# Patient Record
Sex: FEMALE | Race: WHITE | NOT HISPANIC OR LATINO | Employment: FULL TIME | URBAN - METROPOLITAN AREA
[De-identification: names, ages, dates, MRNs, and addresses within clinical notes are randomized per-mention and may not be internally consistent; named-entity substitution may affect disease eponyms.]

---

## 2023-12-21 ENCOUNTER — OFFICE VISIT (OUTPATIENT)
Dept: URGENT CARE | Facility: PHYSICIAN GROUP | Age: 28
End: 2023-12-21
Payer: COMMERCIAL

## 2023-12-21 ENCOUNTER — HOSPITAL ENCOUNTER (OUTPATIENT)
Facility: MEDICAL CENTER | Age: 28
End: 2023-12-21
Payer: COMMERCIAL

## 2023-12-21 VITALS
TEMPERATURE: 98.6 F | HEART RATE: 99 BPM | SYSTOLIC BLOOD PRESSURE: 106 MMHG | OXYGEN SATURATION: 99 % | RESPIRATION RATE: 14 BRPM | WEIGHT: 116 LBS | DIASTOLIC BLOOD PRESSURE: 64 MMHG | HEIGHT: 67 IN | BODY MASS INDEX: 18.21 KG/M2

## 2023-12-21 DIAGNOSIS — Z20.2 ENCOUNTER FOR ASSESSMENT OF STD EXPOSURE: ICD-10-CM

## 2023-12-21 PROCEDURE — 3074F SYST BP LT 130 MM HG: CPT

## 2023-12-21 PROCEDURE — 87591 N.GONORRHOEAE DNA AMP PROB: CPT

## 2023-12-21 PROCEDURE — 99203 OFFICE O/P NEW LOW 30 MIN: CPT

## 2023-12-21 PROCEDURE — 3078F DIAST BP <80 MM HG: CPT

## 2023-12-21 PROCEDURE — 87491 CHLMYD TRACH DNA AMP PROBE: CPT

## 2023-12-21 RX ORDER — NORTRIPTYLINE HYDROCHLORIDE 25 MG/1
50 CAPSULE ORAL 2 TIMES DAILY
COMMUNITY
Start: 2023-11-17

## 2023-12-21 RX ORDER — ATOGEPANT 60 MG/1
1 TABLET ORAL DAILY
COMMUNITY

## 2023-12-21 RX ORDER — LORAZEPAM 1 MG/1
TABLET ORAL
COMMUNITY
Start: 2023-10-09

## 2023-12-21 RX ORDER — RIZATRIPTAN BENZOATE 10 MG/1
10 TABLET ORAL
COMMUNITY

## 2023-12-21 RX ORDER — ONDANSETRON 4 MG/1
4 TABLET, FILM COATED ORAL
COMMUNITY

## 2023-12-21 RX ORDER — GABAPENTIN 300 MG/1
300 CAPSULE ORAL 2 TIMES DAILY
COMMUNITY

## 2023-12-21 ASSESSMENT — ENCOUNTER SYMPTOMS
CHILLS: 0
FEVER: 0
DIARRHEA: 0
ABDOMINAL PAIN: 0
VOMITING: 0
NAUSEA: 0

## 2023-12-21 NOTE — PROGRESS NOTES
"  CHIEF COMPLAINT  Chief Complaint   Patient presents with    Sexually Transmitted Diseases     Check     Hand Pain     Right hand pain, punched steering wheel many times   X 4 days      Subjective:   Tracy Reid is a 28 y.o. female who presents to urgent care for STD testing has complaints to her right hand.  Patient reports concern for sexually transmitted disease as her partner was being monogamous in relationship.  She reports last sexual encounter was 1 month ago.  She denies any fevers or abnormal discharge.  She denies any abnormal bleeding or abdominal pain.  Patient also reports that she intentionally struck her steering well several times the lateral side of her hand.  Patient ports that she did when she is frustrated or stressed.  She reports that this time the site seems more swollen and painful.       Review of Systems   Constitutional:  Negative for chills and fever.   Gastrointestinal:  Negative for abdominal pain, diarrhea, nausea and vomiting.       PAST MEDICAL HISTORY  There are no problems to display for this patient.      SURGICAL HISTORY  patient denies any surgical history    ALLERGIES  Allergies   Allergen Reactions    Promethazine Unspecified     \"Passes out, seizures, stroke-like symptoms.\"    Lamotrigine Rash       CURRENT MEDICATIONS  Home Medications       Reviewed by Anibal Berrios Ass't (Medical Assistant) on 12/21/23 at 1539  Med List Status: <None>     Medication Last Dose Status   gabapentin (NEURONTIN) 300 MG Cap  Active   LORazepam (ATIVAN) 1 MG Tab  Active   nortriptyline (PAMELOR) 25 MG Cap  Active   ondansetron (ZOFRAN) 4 MG Tab tablet  Active   QULIPTA 60 MG Tab  Active   rizatriptan (MAXALT) 10 MG tablet  Active                    SOCIAL HISTORY  Social History     Tobacco Use    Smoking status: Former     Types: Cigarettes    Smokeless tobacco: Never   Vaping Use    Vaping Use: Every day    Substances: Nicotine    Devices: Disposable   Substance and Sexual " "Activity    Alcohol use: Not on file    Drug use: Not on file    Sexual activity: Not on file       FAMILY HISTORY  History reviewed. No pertinent family history.      Medications, Allergies, and current problem list reviewed today in Epic.     Objective:     /64   Pulse 99   Temp 37 °C (98.6 °F) (Temporal)   Resp 14   Ht 1.702 m (5' 7\")   Wt 52.6 kg (116 lb)   SpO2 99%     Physical Exam  Vitals reviewed.   Constitutional:       Appearance: Normal appearance.   HENT:      Mouth/Throat:      Mouth: Mucous membranes are moist.      Pharynx: Oropharynx is clear.   Cardiovascular:      Rate and Rhythm: Normal rate and regular rhythm.      Pulses: Normal pulses.      Heart sounds: Normal heart sounds.   Pulmonary:      Effort: Pulmonary effort is normal. No respiratory distress.      Breath sounds: Normal breath sounds. No wheezing, rhonchi or rales.   Abdominal:      General: There is no distension.      Tenderness: There is no abdominal tenderness. There is no guarding.   Musculoskeletal:         General: Normal range of motion.      Right hand: Swelling and tenderness present. No bony tenderness. Normal strength. There is no disruption of two-point discrimination. Normal capillary refill. Normal pulse.      Cervical back: Normal range of motion and neck supple. No rigidity.   Skin:     General: Skin is warm.      Capillary Refill: Capillary refill takes less than 2 seconds.   Neurological:      General: No focal deficit present.      Mental Status: She is alert.   Psychiatric:         Mood and Affect: Mood normal.         Assessment/Plan:     Diagnosis and associated orders:     1. Encounter for assessment of STD exposure  Chlamydia/GC, PCR (Genital/Anal swab)    HIV AG/AB Combo Assay Screening    T.Pallidum AB GM (Screening)    Trichomonas Vaginalis by TMA    Hepatitis C Virus Antibody    HEP B Surface Antibody    Hep B Core AB Total    Hep B Surface Antigen         Comments/MDM:     Patient presents " urgent care for STD testing.  She reports that her partner in last relationship was not monogamous.  She reports last sexual encounter with him was 1 month ago.  She also reports symptoms of pain to her right hand as she intentionally struck her steering well several times.  Upon physical exam patient is alert no apparent signs of distress.  She is clear to auscultation bilaterally.  Abdomen is soft nondistended.  Right hand is swollen and erythematous.  Normal strength and sensation.  Patient denies any numbness or tingling.  Vital signs are stable in clinic, she is afebrile.  STD panel ordered.  Patient requesting hep B and hep C as well.  Significant soft tissue swelling to lateral aspect of right hand.  Patient instructed to ice, Tylenol Motrin for comfort.  Instructed to return to ER or urgent care if symptoms worsen or fail to improve.         Differential diagnosis, natural history, supportive care, and indications for immediate follow-up discussed.    Advised the patient to follow-up with the primary care physician for recheck, reevaluation, and consideration of further management.    Please note that this dictation was created using voice recognition software. I have made a reasonable attempt to correct obvious errors, but I expect that there are errors of grammar and possibly content that I did not discover before finalizing the note.    This note was electronically signed by АНДРЕЙ Perez

## 2023-12-22 ENCOUNTER — APPOINTMENT (OUTPATIENT)
Dept: URGENT CARE | Facility: PHYSICIAN GROUP | Age: 28
End: 2023-12-22
Payer: COMMERCIAL

## 2023-12-22 LAB
C TRACH DNA GENITAL QL NAA+PROBE: NEGATIVE
N GONORRHOEA DNA GENITAL QL NAA+PROBE: NEGATIVE
SPECIMEN SOURCE: NORMAL

## 2024-02-07 ENCOUNTER — APPOINTMENT (OUTPATIENT)
Dept: URGENT CARE | Facility: CLINIC | Age: 29
End: 2024-02-07

## 2024-02-07 ENCOUNTER — HOSPITAL ENCOUNTER (OUTPATIENT)
Facility: MEDICAL CENTER | Age: 29
End: 2024-02-07
Attending: PHYSICIAN ASSISTANT
Payer: COMMERCIAL

## 2024-02-07 ENCOUNTER — OFFICE VISIT (OUTPATIENT)
Dept: URGENT CARE | Facility: CLINIC | Age: 29
End: 2024-02-07
Payer: COMMERCIAL

## 2024-02-07 VITALS
RESPIRATION RATE: 16 BRPM | HEIGHT: 67 IN | DIASTOLIC BLOOD PRESSURE: 58 MMHG | TEMPERATURE: 97.8 F | WEIGHT: 116 LBS | HEART RATE: 81 BPM | SYSTOLIC BLOOD PRESSURE: 100 MMHG | OXYGEN SATURATION: 98 % | BODY MASS INDEX: 18.21 KG/M2

## 2024-02-07 DIAGNOSIS — N94.9 GENITAL LESION, FEMALE: ICD-10-CM

## 2024-02-07 PROCEDURE — 87529 HSV DNA AMP PROBE: CPT

## 2024-02-07 PROCEDURE — 3078F DIAST BP <80 MM HG: CPT | Performed by: PHYSICIAN ASSISTANT

## 2024-02-07 PROCEDURE — 3074F SYST BP LT 130 MM HG: CPT | Performed by: PHYSICIAN ASSISTANT

## 2024-02-07 PROCEDURE — 99214 OFFICE O/P EST MOD 30 MIN: CPT | Performed by: PHYSICIAN ASSISTANT

## 2024-02-07 RX ORDER — VALACYCLOVIR HYDROCHLORIDE 1 G/1
1000 TABLET, FILM COATED ORAL 3 TIMES DAILY
Qty: 21 TABLET | Refills: 0 | Status: SHIPPED | OUTPATIENT
Start: 2024-02-07 | End: 2024-02-14

## 2024-02-07 ASSESSMENT — ENCOUNTER SYMPTOMS
FEVER: 0
VOMITING: 0
NAUSEA: 0
CHILLS: 0

## 2024-02-10 LAB
HSV1 DNA CSF QL NAA+PROBE: NOT DETECTED
HSV2 DNA CSF QL NAA+PROBE: NOT DETECTED
SPECIMEN SOURCE: NORMAL

## 2024-02-12 NOTE — RESULT ENCOUNTER NOTE
Jelani Molina,    Your testing for HSV was negative.  This means that your symptoms were likely due to folliculitis.  I hope that you are feeling better.  The mupirocin should be helping and I recommend that you continue using it until your symptoms resolved.    Kind regards,  Jose

## 2024-04-23 ENCOUNTER — OFFICE VISIT (OUTPATIENT)
Dept: URGENT CARE | Facility: PHYSICIAN GROUP | Age: 29
End: 2024-04-23
Payer: COMMERCIAL

## 2024-04-23 VITALS
WEIGHT: 126 LBS | HEIGHT: 67 IN | HEART RATE: 111 BPM | TEMPERATURE: 99 F | DIASTOLIC BLOOD PRESSURE: 68 MMHG | BODY MASS INDEX: 19.78 KG/M2 | OXYGEN SATURATION: 97 % | SYSTOLIC BLOOD PRESSURE: 104 MMHG | RESPIRATION RATE: 16 BRPM

## 2024-04-23 DIAGNOSIS — J02.9 SORE THROAT: ICD-10-CM

## 2024-04-23 DIAGNOSIS — R05.1 ACUTE COUGH: ICD-10-CM

## 2024-04-23 LAB
FLUAV RNA SPEC QL NAA+PROBE: NEGATIVE
FLUBV RNA SPEC QL NAA+PROBE: NEGATIVE
RSV RNA SPEC QL NAA+PROBE: NEGATIVE
S PYO DNA SPEC NAA+PROBE: NOT DETECTED
SARS-COV-2 RNA RESP QL NAA+PROBE: NEGATIVE

## 2024-04-23 PROCEDURE — 3074F SYST BP LT 130 MM HG: CPT | Performed by: PHYSICIAN ASSISTANT

## 2024-04-23 PROCEDURE — 99213 OFFICE O/P EST LOW 20 MIN: CPT | Performed by: PHYSICIAN ASSISTANT

## 2024-04-23 PROCEDURE — 0241U POCT CEPHEID COV-2, FLU A/B, RSV - PCR: CPT | Performed by: PHYSICIAN ASSISTANT

## 2024-04-23 PROCEDURE — 3078F DIAST BP <80 MM HG: CPT | Performed by: PHYSICIAN ASSISTANT

## 2024-04-23 PROCEDURE — 87651 STREP A DNA AMP PROBE: CPT | Performed by: PHYSICIAN ASSISTANT

## 2024-04-23 RX ORDER — QUETIAPINE FUMARATE 50 MG/1
TABLET, FILM COATED ORAL
COMMUNITY

## 2024-04-23 RX ORDER — CLONAZEPAM 0.5 MG/1
0.5 TABLET ORAL DAILY
COMMUNITY
Start: 2024-04-03

## 2024-04-23 RX ORDER — MEMANTINE HYDROCHLORIDE 10 MG/1
TABLET ORAL
COMMUNITY
Start: 2024-02-08

## 2024-04-23 RX ORDER — GABAPENTIN 400 MG/1
400 CAPSULE ORAL 3 TIMES DAILY
COMMUNITY
Start: 2024-03-14

## 2024-04-23 ASSESSMENT — ENCOUNTER SYMPTOMS
SORE THROAT: 1
COUGH: 1
FEVER: 1

## 2024-04-23 NOTE — LETTER
April 23, 2024         Patient: Tracy Reid   YOB: 1995   Date of Visit: 4/23/2024           To Whom it May Concern:    Tracy Reid was seen in my clinic on 4/23/2024.  Please excuse her absence from work for today and tomorrow.      Sincerely,           Daylin Manley P.A.-C.  Electronically Signed

## 2024-04-24 NOTE — PROGRESS NOTES
Subjective     Tracy Reid is a 28 y.o. female who presents with Sore Throat (Cough, x3 days )    HPI:  Tracy Reid is a 28 y.o. female who presents today for evaluation of URI symptoms.  Patient reports that for the past 3 days she has had sore throat and cough.  She is also felt very fatigued.  Last night she was having some subjective fever noting that she was feeling very hot even this Sunday that she was with noting that it was cold.  She did not measure her temperature, ever.  She states that she has a history of Lyme and autoimmune disease which sometimes causes her illnesses to show up with different than ordinary symptoms.  She also notes that she has been dealing with recurrent EBV infection over the past few years.      Review of Systems   Constitutional:  Positive for fever and malaise/fatigue.   HENT:  Positive for sore throat.    Respiratory:  Positive for cough.            PMH:  has no past medical history on file.  MEDS:   Current Outpatient Medications:     clonazePAM (KLONOPIN) 0.5 MG Tab, Take 0.5 mg by mouth every day., Disp: , Rfl:     memantine (NAMENDA) 10 MG Tab, TAKE 1/2 (ONE-HALF) TABLET BY MOUTH AT BEDTIME FOR 7 DAYS, INCREASE BY 1/2 (ONE-HALF)TABLET WEEKLY TO GOAL OF 1 TABLET TWICE A DAY, Disp: , Rfl:     QUEtiapine (SEROQUEL) 50 MG tablet, , Disp: , Rfl:     gabapentin (NEURONTIN) 400 MG Cap, Take 400 mg by mouth 3 times a day., Disp: , Rfl:     mupirocin (BACTROBAN) 2 % Ointment, Apply 1 Application topically 2 times a day., Disp: 22 g, Rfl: 0    rizatriptan (MAXALT) 10 MG tablet, Take 10 mg by mouth., Disp: , Rfl:     ondansetron (ZOFRAN) 4 MG Tab tablet, Take 4 mg by mouth., Disp: , Rfl:     nortriptyline (PAMELOR) 25 MG Cap, Take 50 mg by mouth 2 times a day., Disp: , Rfl:     LORazepam (ATIVAN) 1 MG Tab, TAKE 1 TABLET BY MOUTH ONCE DAILY AS NEEDED FOR PANIC ATTACK FOR 30 DAYS, Disp: , Rfl:     QULIPTA 60 MG Tab, Take 1 Tablet by mouth every day., Disp: , Rfl:      "gabapentin (NEURONTIN) 300 MG Cap, Take 300 mg by mouth 2 times a day. (Patient not taking: Reported on 4/23/2024), Disp: , Rfl:   ALLERGIES:   Allergies   Allergen Reactions    Promethazine Unspecified     \"Passes out, seizures, stroke-like symptoms.\"    Lamotrigine Rash     SURGHX: History reviewed. No pertinent surgical history.  SOCHX:  reports that she has quit smoking. Her smoking use included cigarettes. She has never used smokeless tobacco.  FH: Family history was reviewed, no pertinent findings to report        Objective     /68 (BP Location: Left arm, Patient Position: Sitting, BP Cuff Size: Adult)   Pulse (!) 111   Temp 37.2 °C (99 °F) (Temporal)   Resp 16   Ht 1.702 m (5' 7\")   Wt 57.2 kg (126 lb)   SpO2 97%   BMI 19.73 kg/m²      Physical Exam  Constitutional:       Appearance: She is well-developed.   HENT:      Head: Normocephalic and atraumatic.      Right Ear: Tympanic membrane, ear canal and external ear normal.      Left Ear: Tympanic membrane, ear canal and external ear normal.      Nose: Mucosal edema present. No congestion or rhinorrhea.      Mouth/Throat:      Lips: Pink.      Mouth: Mucous membranes are moist.      Pharynx: Oropharynx is clear.   Eyes:      Conjunctiva/sclera: Conjunctivae normal.      Pupils: Pupils are equal, round, and reactive to light.   Cardiovascular:      Rate and Rhythm: Regular rhythm. Tachycardia present.      Heart sounds: Normal heart sounds. No murmur heard.  Pulmonary:      Effort: Pulmonary effort is normal.      Breath sounds: Normal breath sounds. No decreased breath sounds, wheezing, rhonchi or rales.   Musculoskeletal:      Cervical back: Normal range of motion.   Lymphadenopathy:      Cervical: Cervical adenopathy present.   Skin:     General: Skin is warm and dry.      Capillary Refill: Capillary refill takes less than 2 seconds.   Neurological:      Mental Status: She is alert and oriented to person, place, and time.   Psychiatric:         " Behavior: Behavior normal.         Judgment: Judgment normal.       POCT GROUP A STREP, PCR - Negative      POCT CoV-2, Flu A/B, RSV by PCR - Negative    Assessment & Plan       1. Sore throat  - POCT GROUP A STREP, PCR  - POCT CoV-2, Flu A/B, RSV by PCR    2. Acute cough  - POCT CoV-2, Flu A/B, RSV by PCR  - OTC cold/flu medications  -Supportive care also discussed to include the use of saline nasal rinses, steam inhalation, and the use of a cool-mist humidifier in the bedroom at night.  - PO fluids  - Rest  - Tylenol or ibuprofen as needed for fever > 100.4 F    PCR strep test negative for COVID, flu, RSV, and strep.  Symptoms likely still secondary to a viral URI.  Recommend over-the-counter medication and supportive care for symptom relief.  Note provided for work.               Differential Diagnosis, natural history, and supportive care discussed. Return to the Urgent Care or follow up with your PCP if symptoms fail to resolve, or for any new or worsening symptoms. Emergency room precautions discussed. Patient and/or family appears understanding of information.

## 2024-07-06 ENCOUNTER — APPOINTMENT (OUTPATIENT)
Dept: RADIOLOGY | Facility: MEDICAL CENTER | Age: 29
End: 2024-07-06
Payer: COMMERCIAL

## 2024-07-29 ENCOUNTER — APPOINTMENT (RX ONLY)
Dept: URBAN - METROPOLITAN AREA CLINIC 4 | Facility: CLINIC | Age: 29
Setting detail: DERMATOLOGY
End: 2024-07-29

## 2024-07-29 DIAGNOSIS — L21.8 OTHER SEBORRHEIC DERMATITIS: ICD-10-CM

## 2024-07-29 DIAGNOSIS — L82.1 OTHER SEBORRHEIC KERATOSIS: ICD-10-CM

## 2024-07-29 DIAGNOSIS — L81.4 OTHER MELANIN HYPERPIGMENTATION: ICD-10-CM

## 2024-07-29 DIAGNOSIS — D18.0 HEMANGIOMA: ICD-10-CM

## 2024-07-29 DIAGNOSIS — D22 MELANOCYTIC NEVI: ICD-10-CM

## 2024-07-29 DIAGNOSIS — Z71.89 OTHER SPECIFIED COUNSELING: ICD-10-CM

## 2024-07-29 PROBLEM — D18.01 HEMANGIOMA OF SKIN AND SUBCUTANEOUS TISSUE: Status: ACTIVE | Noted: 2024-07-29

## 2024-07-29 PROBLEM — D22.5 MELANOCYTIC NEVI OF TRUNK: Status: ACTIVE | Noted: 2024-07-29

## 2024-07-29 PROCEDURE — ? COUNSELING

## 2024-07-29 PROCEDURE — 99203 OFFICE O/P NEW LOW 30 MIN: CPT

## 2024-07-29 PROCEDURE — ? OBSERVATION

## 2024-07-29 ASSESSMENT — LOCATION DETAILED DESCRIPTION DERM
LOCATION DETAILED: LEFT INFERIOR MEDIAL MALAR CHEEK
LOCATION DETAILED: RIGHT MID-UPPER BACK
LOCATION DETAILED: RIGHT SUPERIOR MEDIAL UPPER BACK
LOCATION DETAILED: RIGHT INFERIOR UPPER BACK
LOCATION DETAILED: UPPER STERNUM
LOCATION DETAILED: RIGHT SUPERIOR UPPER BACK
LOCATION DETAILED: LEFT MEDIAL SUPERIOR CHEST

## 2024-07-29 ASSESSMENT — LOCATION ZONE DERM
LOCATION ZONE: TRUNK
LOCATION ZONE: FACE

## 2024-07-29 ASSESSMENT — LOCATION SIMPLE DESCRIPTION DERM
LOCATION SIMPLE: RIGHT UPPER BACK
LOCATION SIMPLE: LEFT CHEEK
LOCATION SIMPLE: CHEST

## 2024-07-29 NOTE — PROCEDURE: COUNSELING
Detail Level: Zone
Detail Level: Generalized
Sunscreen Recommendations: I recommend a zinc or titanium based sunscreen for daily wear.  I recommend 30 spf  or greater.
Detail Level: Detailed
Shampoo Recommendations: Over the counter head and shoulder or Tsal

## 2024-09-17 ENCOUNTER — OFFICE VISIT (OUTPATIENT)
Dept: URGENT CARE | Facility: PHYSICIAN GROUP | Age: 29
End: 2024-09-17
Payer: COMMERCIAL

## 2024-09-17 VITALS
HEART RATE: 122 BPM | DIASTOLIC BLOOD PRESSURE: 66 MMHG | SYSTOLIC BLOOD PRESSURE: 100 MMHG | BODY MASS INDEX: 21.38 KG/M2 | OXYGEN SATURATION: 96 % | TEMPERATURE: 98 F | WEIGHT: 136.2 LBS | RESPIRATION RATE: 16 BRPM | HEIGHT: 67 IN

## 2024-09-17 DIAGNOSIS — H00.014 HORDEOLUM EXTERNUM LEFT UPPER EYELID: ICD-10-CM

## 2024-09-17 PROCEDURE — 3074F SYST BP LT 130 MM HG: CPT

## 2024-09-17 PROCEDURE — 99213 OFFICE O/P EST LOW 20 MIN: CPT

## 2024-09-17 PROCEDURE — 3078F DIAST BP <80 MM HG: CPT

## 2024-09-17 RX ORDER — ERYTHROMYCIN 5 MG/G
1 OINTMENT OPHTHALMIC
Qty: 1 G | Refills: 0 | Status: SHIPPED | OUTPATIENT
Start: 2024-09-17 | End: 2024-09-24

## 2024-09-17 RX ORDER — RIMEGEPANT SULFATE 75 MG/75MG
TABLET, ORALLY DISINTEGRATING ORAL
COMMUNITY
Start: 2024-09-03

## 2024-09-17 RX ORDER — METOCLOPRAMIDE 10 MG/1
TABLET ORAL
COMMUNITY
Start: 2024-07-14

## 2024-09-17 RX ORDER — NORTRIPTYLINE HYDROCHLORIDE 50 MG/1
CAPSULE ORAL
COMMUNITY
Start: 2024-09-04

## 2024-09-17 RX ORDER — MOXIFLOXACIN 5 MG/ML
1 SOLUTION/ DROPS OPHTHALMIC 3 TIMES DAILY
Qty: 2 ML | Refills: 0 | Status: SHIPPED | OUTPATIENT
Start: 2024-09-17 | End: 2024-09-24

## 2024-09-17 RX ORDER — SACROSIDASE 8500 [IU]/ML
SOLUTION ORAL
COMMUNITY
Start: 2024-09-10

## 2024-09-17 ASSESSMENT — ENCOUNTER SYMPTOMS
WEIGHT LOSS: 0
BLURRED VISION: 0
EYE REDNESS: 1
SINUS PAIN: 0
EYE ITCHING: 0
CONSTITUTIONAL NEGATIVE: 1
PHOTOPHOBIA: 0
RESPIRATORY NEGATIVE: 1
CARDIOVASCULAR NEGATIVE: 1
EYE DISCHARGE: 0
VOMITING: 0
FOREIGN BODY SENSATION: 0
COUGH: 0
FEVER: 0
NAUSEA: 0
CHILLS: 0
DOUBLE VISION: 0
SORE THROAT: 0

## 2024-09-18 NOTE — PROGRESS NOTES
"Subjective:   Tracy Reid is a 29 y.o. female who presents for Eye Problem (L eye redness, swelling, eye lid swelling, sore, onset today )      Patient presents with complaints of left eye swelling, redness, and itchiness.  States that onset was earlier this morning, did not have any mucopurulent discharge, but did notice some swelling and tenderness in her upper left eyelid.  States that she has had multiple styes in the past usually treated \"homeopathically\" with colloidal silver spray, but states this 1 felt different.  Denies any fever, chills, body aches runny nose congestion or headache    Eye Problem   The left eye is affected. This is a new problem. The current episode started today. The problem occurs constantly. The problem has been unchanged. There was no injury mechanism. The pain is at a severity of 1/10. The pain is mild. There is No known exposure to pink eye. She Does not wear contacts. Associated symptoms include eye redness. Pertinent negatives include no blurred vision, eye discharge, double vision, fever, foreign body sensation, itching, nausea, photophobia, recent URI or vomiting. She has tried commercial eye wash and eye drops for the symptoms. The treatment provided no relief.       Review of Systems   Constitutional: Negative.  Negative for chills, fever and weight loss.   HENT:  Negative for congestion, ear discharge, ear pain, sinus pain and sore throat.    Eyes:  Positive for redness. Negative for blurred vision, double vision, photophobia, discharge and itching.   Respiratory: Negative.  Negative for cough.    Cardiovascular: Negative.    Gastrointestinal:  Negative for nausea and vomiting.   Skin: Negative.        Medications, Allergies, and current problem list reviewed today in Epic.     Objective:     /66 (BP Location: Right arm, Patient Position: Sitting, BP Cuff Size: Adult)   Pulse (!) 122   Temp 36.7 °C (98 °F) (Temporal)   Resp 16   Ht 1.702 m (5' 7\")   Wt " 61.8 kg (136 lb 3.2 oz)   SpO2 96%     Physical Exam  Constitutional:       General: She is not in acute distress.     Appearance: She is not ill-appearing.   HENT:      Right Ear: Tympanic membrane, ear canal and external ear normal.      Left Ear: Tympanic membrane, ear canal and external ear normal.      Nose: Nose normal. No congestion or rhinorrhea.      Mouth/Throat:      Mouth: Mucous membranes are moist.      Pharynx: No oropharyngeal exudate or posterior oropharyngeal erythema.   Eyes:      General:         Right eye: No foreign body, discharge or hordeolum.         Left eye: Hordeolum present.No foreign body or discharge.      Pupils: Pupils are equal, round, and reactive to light.        Comments: Marked swelling of the upper left eyelid.  Patient does have eyelash extensions, no signs of cellulitis, no discharge or drainage   Cardiovascular:      Rate and Rhythm: Normal rate.   Pulmonary:      Effort: Pulmonary effort is normal.   Musculoskeletal:      Cervical back: Normal range of motion.   Lymphadenopathy:      Cervical: No cervical adenopathy.   Neurological:      Mental Status: She is alert.         Assessment/Plan:     Diagnosis and associated orders:     1. Hordeolum externum left upper eyelid  - moxifloxacin (VIGAMOX) 0.5 % Solution; Administer 1 Drop into both eyes 3 times a day for 7 days.  Dispense: 2 mL; Refill: 0  - erythromycin 5 MG/GM Ointment; Apply 1 Application to both eyes at bedtime for 7 days.  Dispense: 1 g; Refill: 0    Other orders  - metoclopramide (REGLAN) 10 MG Tab; TAKE 1 TABLET BY MOUTH THREE TIMES DAILY AS NEEDED FOR NAUSEA AND VOMITING FOR 7 DAYS  - NURTEC 75 MG TABLET DISPERSIBLE; DISSOLVE 1 TABLET BY MOUTH EVERY OTHER DAY FOR MIGRAINE PREVENTION  - SUCRAID 8500 UNIT/ML Solution  - nortriptyline (PAMELOR) 50 MG capsule; TAKE 1 CAPSULE BY MOUTH TWICE DAILY FOR MIGRAINE PREVENTION  - NORTRIPTYLINE HCL PO; Take  by mouth every day.  - SACROSIDASE PO; For CSID      Comments/MDM:     Patient history and physical exam are consistent with acute hordeolum internum of the left upper eyelid  Outpatient management will consist of warm compresses, baby shampoo to the affected eye, ophthalmic antibiotics  Tylenol/ibuprofen to help with eye swelling and pain  Advised, discussed, and collaborative decision making with the patient included watchful waiting, treating conservatively, and treating with antibiotics.  Patient had agreed treating with antibiotics with sparse idea to avoid worsening infection  Patient understands the treatment plan of care, and further follow-up if needed.  No further questions  Erythromycin ointment nightly  Moxifloxacin 3 times daily during the day         Differential diagnosis, natural history, supportive care, and indications for immediate follow-up discussed.    Advised the patient to follow-up with the primary care physician for recheck, reevaluation, and consideration of further management.    Please note that this dictation was created using voice recognition software. I have made a reasonable attempt to correct obvious errors, but I expect that there are errors of grammar and possibly content that I did not discover before finalizing the note.    This note was electronically signed by АНДРЕЙ Garcia

## 2024-10-14 ENCOUNTER — APPOINTMENT (OUTPATIENT)
Dept: RADIOLOGY | Facility: MEDICAL CENTER | Age: 29
End: 2024-10-14
Attending: EMERGENCY MEDICINE
Payer: COMMERCIAL

## 2024-10-14 ENCOUNTER — HOSPITAL ENCOUNTER (EMERGENCY)
Facility: MEDICAL CENTER | Age: 29
End: 2024-10-14
Attending: EMERGENCY MEDICINE
Payer: COMMERCIAL

## 2024-10-14 VITALS
RESPIRATION RATE: 18 BRPM | SYSTOLIC BLOOD PRESSURE: 113 MMHG | BODY MASS INDEX: 20.87 KG/M2 | HEIGHT: 67 IN | WEIGHT: 132.94 LBS | OXYGEN SATURATION: 98 % | TEMPERATURE: 98.4 F | DIASTOLIC BLOOD PRESSURE: 72 MMHG | HEART RATE: 90 BPM

## 2024-10-14 DIAGNOSIS — R51.9 NONINTRACTABLE HEADACHE, UNSPECIFIED CHRONICITY PATTERN, UNSPECIFIED HEADACHE TYPE: ICD-10-CM

## 2024-10-14 LAB
FLUAV RNA SPEC QL NAA+PROBE: NEGATIVE
FLUBV RNA SPEC QL NAA+PROBE: NEGATIVE
RSV RNA SPEC QL NAA+PROBE: NEGATIVE
SARS-COV-2 RNA RESP QL NAA+PROBE: NOTDETECTED
SPECIMEN SOURCE: NORMAL

## 2024-10-14 PROCEDURE — 0241U HCHG SARS-COV-2 COVID-19 NFCT DS RESP RNA 4 TRGT MIC: CPT

## 2024-10-14 PROCEDURE — 99283 EMERGENCY DEPT VISIT LOW MDM: CPT

## 2024-10-14 PROCEDURE — 70450 CT HEAD/BRAIN W/O DYE: CPT

## 2024-10-15 ENCOUNTER — HOSPITAL ENCOUNTER (EMERGENCY)
Facility: MEDICAL CENTER | Age: 29
End: 2024-10-15
Attending: EMERGENCY MEDICINE
Payer: COMMERCIAL

## 2024-10-15 ENCOUNTER — OFFICE VISIT (OUTPATIENT)
Dept: URGENT CARE | Facility: CLINIC | Age: 29
End: 2024-10-15
Payer: COMMERCIAL

## 2024-10-15 VITALS
BODY MASS INDEX: 20.72 KG/M2 | WEIGHT: 132 LBS | TEMPERATURE: 98.1 F | HEIGHT: 67 IN | RESPIRATION RATE: 15 BRPM | DIASTOLIC BLOOD PRESSURE: 60 MMHG | OXYGEN SATURATION: 96 % | HEART RATE: 105 BPM | SYSTOLIC BLOOD PRESSURE: 104 MMHG

## 2024-10-15 VITALS
OXYGEN SATURATION: 98 % | BODY MASS INDEX: 21.11 KG/M2 | RESPIRATION RATE: 18 BRPM | SYSTOLIC BLOOD PRESSURE: 103 MMHG | WEIGHT: 134.48 LBS | HEIGHT: 67 IN | HEART RATE: 89 BPM | TEMPERATURE: 98 F | DIASTOLIC BLOOD PRESSURE: 70 MMHG

## 2024-10-15 DIAGNOSIS — R73.09 ELEVATED GLUCOSE: ICD-10-CM

## 2024-10-15 DIAGNOSIS — R53.83 OTHER FATIGUE: ICD-10-CM

## 2024-10-15 DIAGNOSIS — R63.1 EXCESSIVE THIRST: ICD-10-CM

## 2024-10-15 LAB
ALBUMIN SERPL BCP-MCNC: 4.7 G/DL (ref 3.2–4.9)
ALBUMIN/GLOB SERPL: 1.9 G/DL
ALP SERPL-CCNC: 67 U/L (ref 30–99)
ALT SERPL-CCNC: 13 U/L (ref 2–50)
ANION GAP SERPL CALC-SCNC: 12 MMOL/L (ref 7–16)
APPEARANCE UR: CLEAR
AST SERPL-CCNC: 14 U/L (ref 12–45)
BASOPHILS # BLD AUTO: 0.6 % (ref 0–1.8)
BASOPHILS # BLD: 0.05 K/UL (ref 0–0.12)
BILIRUB SERPL-MCNC: 0.6 MG/DL (ref 0.1–1.5)
BILIRUB UR QL STRIP.AUTO: NEGATIVE
BUN SERPL-MCNC: 9 MG/DL (ref 8–22)
CALCIUM ALBUM COR SERPL-MCNC: 8.7 MG/DL (ref 8.5–10.5)
CALCIUM SERPL-MCNC: 9.3 MG/DL (ref 8.5–10.5)
CHLORIDE SERPL-SCNC: 106 MMOL/L (ref 96–112)
CO2 SERPL-SCNC: 23 MMOL/L (ref 20–33)
COLOR UR: YELLOW
CREAT SERPL-MCNC: 0.44 MG/DL (ref 0.5–1.4)
EOSINOPHIL # BLD AUTO: 0.12 K/UL (ref 0–0.51)
EOSINOPHIL NFR BLD: 1.3 % (ref 0–6.9)
ERYTHROCYTE [DISTWIDTH] IN BLOOD BY AUTOMATED COUNT: 38.3 FL (ref 35.9–50)
GFR SERPLBLD CREATININE-BSD FMLA CKD-EPI: 136 ML/MIN/1.73 M 2
GLOBULIN SER CALC-MCNC: 2.5 G/DL (ref 1.9–3.5)
GLUCOSE BLD-MCNC: 194 MG/DL (ref 65–99)
GLUCOSE SERPL-MCNC: 73 MG/DL (ref 65–99)
GLUCOSE UR STRIP.AUTO-MCNC: NEGATIVE MG/DL
HCG UR QL: NEGATIVE
HCT VFR BLD AUTO: 43.9 % (ref 37–47)
HGB BLD-MCNC: 14.5 G/DL (ref 12–16)
IMM GRANULOCYTES # BLD AUTO: 0.02 K/UL (ref 0–0.11)
IMM GRANULOCYTES NFR BLD AUTO: 0.2 % (ref 0–0.9)
KETONES UR STRIP.AUTO-MCNC: 15 MG/DL
LEUKOCYTE ESTERASE UR QL STRIP.AUTO: NEGATIVE
LYMPHOCYTES # BLD AUTO: 3 K/UL (ref 1–4.8)
LYMPHOCYTES NFR BLD: 33.4 % (ref 22–41)
MAGNESIUM SERPL-MCNC: 2.3 MG/DL (ref 1.5–2.5)
MCH RBC QN AUTO: 29.1 PG (ref 27–33)
MCHC RBC AUTO-ENTMCNC: 33 G/DL (ref 32.2–35.5)
MCV RBC AUTO: 88 FL (ref 81.4–97.8)
MICRO URNS: ABNORMAL
MONOCYTES # BLD AUTO: 0.65 K/UL (ref 0–0.85)
MONOCYTES NFR BLD AUTO: 7.2 % (ref 0–13.4)
NEUTROPHILS # BLD AUTO: 5.15 K/UL (ref 1.82–7.42)
NEUTROPHILS NFR BLD: 57.3 % (ref 44–72)
NITRITE UR QL STRIP.AUTO: NEGATIVE
NRBC # BLD AUTO: 0 K/UL
NRBC BLD-RTO: 0 /100 WBC (ref 0–0.2)
PH UR STRIP.AUTO: 6 [PH] (ref 5–8)
PLATELET # BLD AUTO: 370 K/UL (ref 164–446)
PMV BLD AUTO: 10 FL (ref 9–12.9)
POTASSIUM SERPL-SCNC: 3.5 MMOL/L (ref 3.6–5.5)
PROT SERPL-MCNC: 7.2 G/DL (ref 6–8.2)
PROT UR QL STRIP: NEGATIVE MG/DL
RBC # BLD AUTO: 4.99 M/UL (ref 4.2–5.4)
RBC UR QL AUTO: NEGATIVE
SODIUM SERPL-SCNC: 141 MMOL/L (ref 135–145)
SP GR UR STRIP.AUTO: 1.01
UROBILINOGEN UR STRIP.AUTO-MCNC: 0.2 MG/DL
WBC # BLD AUTO: 9 K/UL (ref 4.8–10.8)

## 2024-10-15 PROCEDURE — 81003 URINALYSIS AUTO W/O SCOPE: CPT

## 2024-10-15 PROCEDURE — 36415 COLL VENOUS BLD VENIPUNCTURE: CPT

## 2024-10-15 PROCEDURE — 80053 COMPREHEN METABOLIC PANEL: CPT

## 2024-10-15 PROCEDURE — 85025 COMPLETE CBC W/AUTO DIFF WBC: CPT

## 2024-10-15 PROCEDURE — 82962 GLUCOSE BLOOD TEST: CPT

## 2024-10-15 PROCEDURE — 99284 EMERGENCY DEPT VISIT MOD MDM: CPT

## 2024-10-15 PROCEDURE — 86618 LYME DISEASE ANTIBODY: CPT

## 2024-10-15 PROCEDURE — 81025 URINE PREGNANCY TEST: CPT

## 2024-10-15 PROCEDURE — 3074F SYST BP LT 130 MM HG: CPT

## 2024-10-15 PROCEDURE — 99214 OFFICE O/P EST MOD 30 MIN: CPT

## 2024-10-15 PROCEDURE — 83735 ASSAY OF MAGNESIUM: CPT

## 2024-10-15 PROCEDURE — 3078F DIAST BP <80 MM HG: CPT

## 2024-10-15 ASSESSMENT — PAIN DESCRIPTION - PAIN TYPE: TYPE: ACUTE PAIN

## 2024-10-17 LAB — B BURGDOR.VLSE1+PEPC10 AB SER IA-ACNC: 0.43 IV

## 2024-10-17 ASSESSMENT — ENCOUNTER SYMPTOMS
NAUSEA: 1
VOMITING: 1
CHILLS: 0
DIARRHEA: 1
FEVER: 0

## 2024-11-06 ENCOUNTER — HOSPITAL ENCOUNTER (OUTPATIENT)
Dept: RADIOLOGY | Facility: MEDICAL CENTER | Age: 29
End: 2024-11-06
Payer: COMMERCIAL

## 2024-11-06 DIAGNOSIS — R29.90 UNSPECIFIED SYMPTOMS AND SIGNS INVOLVING THE NERVOUS SYSTEM: ICD-10-CM

## 2024-11-06 DIAGNOSIS — G43.009 MIGRAINE WITHOUT AURA AND WITHOUT STATUS MIGRAINOSUS, NOT INTRACTABLE: ICD-10-CM

## 2024-11-06 DIAGNOSIS — A69.20 LYME DISEASE: ICD-10-CM

## 2024-11-06 DIAGNOSIS — G44.89 OTHER HEADACHE SYNDROME: ICD-10-CM

## 2024-11-06 PROCEDURE — 70551 MRI BRAIN STEM W/O DYE: CPT

## 2024-11-25 ENCOUNTER — OFFICE VISIT (OUTPATIENT)
Dept: URGENT CARE | Facility: PHYSICIAN GROUP | Age: 29
End: 2024-11-25
Payer: COMMERCIAL

## 2024-11-25 ENCOUNTER — HOSPITAL ENCOUNTER (OUTPATIENT)
Dept: RADIOLOGY | Facility: MEDICAL CENTER | Age: 29
End: 2024-11-25
Attending: PHYSICIAN ASSISTANT
Payer: COMMERCIAL

## 2024-11-25 VITALS
OXYGEN SATURATION: 99 % | RESPIRATION RATE: 16 BRPM | HEIGHT: 67 IN | HEART RATE: 95 BPM | BODY MASS INDEX: 21.97 KG/M2 | DIASTOLIC BLOOD PRESSURE: 60 MMHG | SYSTOLIC BLOOD PRESSURE: 118 MMHG | WEIGHT: 140 LBS | TEMPERATURE: 98.5 F

## 2024-11-25 DIAGNOSIS — R09.89 CHEST CONGESTION: ICD-10-CM

## 2024-11-25 DIAGNOSIS — R05.1 ACUTE COUGH: ICD-10-CM

## 2024-11-25 LAB
FLUAV RNA SPEC QL NAA+PROBE: NEGATIVE
FLUBV RNA SPEC QL NAA+PROBE: NEGATIVE
RSV RNA SPEC QL NAA+PROBE: NEGATIVE
SARS-COV-2 RNA RESP QL NAA+PROBE: NEGATIVE

## 2024-11-25 PROCEDURE — 3078F DIAST BP <80 MM HG: CPT | Performed by: PHYSICIAN ASSISTANT

## 2024-11-25 PROCEDURE — 71046 X-RAY EXAM CHEST 2 VIEWS: CPT

## 2024-11-25 PROCEDURE — 99214 OFFICE O/P EST MOD 30 MIN: CPT | Performed by: PHYSICIAN ASSISTANT

## 2024-11-25 PROCEDURE — 0241U POCT CEPHEID COV-2, FLU A/B, RSV - PCR: CPT | Performed by: PHYSICIAN ASSISTANT

## 2024-11-25 PROCEDURE — 3074F SYST BP LT 130 MM HG: CPT | Performed by: PHYSICIAN ASSISTANT

## 2024-11-25 RX ORDER — PROPRANOLOL HYDROCHLORIDE 10 MG/1
TABLET ORAL
COMMUNITY
Start: 2024-11-11

## 2024-11-25 RX ORDER — ALBUTEROL SULFATE 90 UG/1
2 INHALANT RESPIRATORY (INHALATION) EVERY 6 HOURS PRN
Qty: 8.5 G | Refills: 0 | Status: SHIPPED | OUTPATIENT
Start: 2024-11-25

## 2024-11-25 ASSESSMENT — ENCOUNTER SYMPTOMS
SPUTUM PRODUCTION: 1
ABDOMINAL PAIN: 0
HEMOPTYSIS: 0
PALPITATIONS: 0
WHEEZING: 0
FEVER: 0
SORE THROAT: 0
SHORTNESS OF BREATH: 0
COUGH: 1
CHILLS: 0

## 2024-11-25 ASSESSMENT — FIBROSIS 4 INDEX: FIB4 SCORE: 0.28

## 2024-11-26 ENCOUNTER — OFFICE VISIT (OUTPATIENT)
Dept: URGENT CARE | Facility: PHYSICIAN GROUP | Age: 29
End: 2024-11-26
Payer: COMMERCIAL

## 2024-11-26 VITALS
WEIGHT: 137.5 LBS | SYSTOLIC BLOOD PRESSURE: 100 MMHG | BODY MASS INDEX: 21.58 KG/M2 | HEIGHT: 67 IN | DIASTOLIC BLOOD PRESSURE: 58 MMHG | RESPIRATION RATE: 18 BRPM | TEMPERATURE: 99.6 F | OXYGEN SATURATION: 97 % | HEART RATE: 114 BPM

## 2024-11-26 DIAGNOSIS — J10.1 INFLUENZA A: ICD-10-CM

## 2024-11-26 LAB
FLUAV RNA SPEC QL NAA+PROBE: POSITIVE
FLUBV RNA SPEC QL NAA+PROBE: NEGATIVE
RSV RNA SPEC QL NAA+PROBE: NEGATIVE
SARS-COV-2 RNA RESP QL NAA+PROBE: NEGATIVE

## 2024-11-26 PROCEDURE — 99214 OFFICE O/P EST MOD 30 MIN: CPT

## 2024-11-26 PROCEDURE — 3078F DIAST BP <80 MM HG: CPT

## 2024-11-26 PROCEDURE — 3074F SYST BP LT 130 MM HG: CPT

## 2024-11-26 PROCEDURE — 0241U POCT CEPHEID COV-2, FLU A/B, RSV - PCR: CPT

## 2024-11-26 RX ORDER — BENZONATATE 100 MG/1
100 CAPSULE ORAL 3 TIMES DAILY PRN
Qty: 30 CAPSULE | Refills: 0 | Status: SHIPPED | OUTPATIENT
Start: 2024-11-26 | End: 2024-12-06

## 2024-11-26 RX ORDER — OSELTAMIVIR PHOSPHATE 75 MG/1
75 CAPSULE ORAL 2 TIMES DAILY
Qty: 10 CAPSULE | Refills: 0 | Status: SHIPPED | OUTPATIENT
Start: 2024-11-26

## 2024-11-26 ASSESSMENT — ENCOUNTER SYMPTOMS
DIARRHEA: 0
MYALGIAS: 1
COUGH: 1
VOMITING: 0
SORE THROAT: 0
NAUSEA: 0
CHILLS: 1
ABDOMINAL PAIN: 0
SHORTNESS OF BREATH: 0
FEVER: 1
HEADACHES: 1

## 2024-11-26 ASSESSMENT — FIBROSIS 4 INDEX: FIB4 SCORE: 0.28

## 2024-11-26 NOTE — PROGRESS NOTES
Subjective:   Tracy Reid is a 29 y.o. female who presents today with   Chief Complaint   Patient presents with    Cough     X 1 day Rib pain on bottom rib cage, chest congestion.       Cough  This is a new problem. The current episode started yesterday. The problem has been unchanged. The problem occurs every few minutes. Pertinent negatives include no chills, fever, hemoptysis, sore throat, shortness of breath or wheezing. She has tried nothing for the symptoms. The treatment provided no relief.     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  She states she was doing something else in the garage and was a distance away from her partner when he was engraving and did not have a mask on and did not feel like she inhaled any glasses there was no other glass or dust around her.  She was at a party/friends gathering on Saturday with 40 people.    PMH:  has a past medical history of Lyme disease.  MEDS:   Current Outpatient Medications:     propranolol (INDERAL) 10 MG Tab, TAKE 1 TABLET BY MOUTH TWICE DAILY FOR HEADACHE PREVENTION, Disp: , Rfl:     albuterol 108 (90 Base) MCG/ACT Aero Soln inhalation aerosol, Inhale 2 Puffs every 6 hours as needed for Shortness of Breath., Disp: 8.5 g, Rfl: 0    metoclopramide (REGLAN) 10 MG Tab, TAKE 1 TABLET BY MOUTH THREE TIMES DAILY AS NEEDED FOR NAUSEA AND VOMITING FOR 7 DAYS, Disp: , Rfl:     NURTEC 75 MG TABLET DISPERSIBLE, DISSOLVE 1 TABLET BY MOUTH EVERY OTHER DAY FOR MIGRAINE PREVENTION, Disp: , Rfl:     SUCRAID 8500 UNIT/ML Solution, , Disp: , Rfl:     nortriptyline (PAMELOR) 50 MG capsule, TAKE 1 CAPSULE BY MOUTH TWICE DAILY FOR MIGRAINE PREVENTION, Disp: , Rfl:     NORTRIPTYLINE HCL PO, Take  by mouth every day., Disp: , Rfl:     SACROSIDASE PO, For CSID, Disp: , Rfl:     clonazePAM (KLONOPIN) 0.5 MG Tab, Take 0.5 mg by mouth every day., Disp: , Rfl:     memantine (NAMENDA) 10 MG Tab, TAKE 1/2  "(ONE-HALF) TABLET BY MOUTH AT BEDTIME FOR 7 DAYS, INCREASE BY 1/2 (ONE-HALF)TABLET WEEKLY TO GOAL OF 1 TABLET TWICE A DAY, Disp: , Rfl:     QUEtiapine (SEROQUEL) 50 MG tablet, , Disp: , Rfl:     gabapentin (NEURONTIN) 400 MG Cap, Take 400 mg by mouth 3 times a day., Disp: , Rfl:     mupirocin (BACTROBAN) 2 % Ointment, Apply 1 Application topically 2 times a day., Disp: 22 g, Rfl: 0    rizatriptan (MAXALT) 10 MG tablet, Take 10 mg by mouth., Disp: , Rfl:     ondansetron (ZOFRAN) 4 MG Tab tablet, Take 4 mg by mouth., Disp: , Rfl:     nortriptyline (PAMELOR) 25 MG Cap, Take 50 mg by mouth 2 times a day., Disp: , Rfl:     LORazepam (ATIVAN) 1 MG Tab, TAKE 1 TABLET BY MOUTH ONCE DAILY AS NEEDED FOR PANIC ATTACK FOR 30 DAYS, Disp: , Rfl:     gabapentin (NEURONTIN) 300 MG Cap, Take 300 mg by mouth 2 times a day., Disp: , Rfl:     QULIPTA 60 MG Tab, Take 1 Tablet by mouth every day., Disp: , Rfl:   ALLERGIES:   Allergies   Allergen Reactions    Promethazine Unspecified     \"Passes out, seizures, stroke-like symptoms.\"    Ciprofloxacin      \"Anything in the flox class I don't want\"    Lamotrigine Rash     SURGHX: History reviewed. No pertinent surgical history.  SOCHX:  reports that she has quit smoking. Her smoking use included cigarettes. She has never used smokeless tobacco. She reports that she does not currently use alcohol. She reports current drug use.  FH: Reviewed with patient, not pertinent to this visit.       Review of Systems   Constitutional:  Negative for chills and fever.   HENT:  Positive for congestion. Negative for sore throat.    Respiratory:  Positive for cough and sputum production. Negative for hemoptysis, shortness of breath and wheezing.    Cardiovascular:  Negative for palpitations.   Gastrointestinal:  Negative for abdominal pain.        Objective:   /60   Pulse 95   Temp 36.9 °C (98.5 °F)   Resp 16   Ht 1.702 m (5' 7\")   Wt 63.5 kg (140 lb)   SpO2 99%   BMI 21.93 kg/m²   Physical " Exam  Vitals and nursing note reviewed.   Constitutional:       General: She is not in acute distress.     Appearance: Normal appearance. She is well-developed. She is not ill-appearing or toxic-appearing.   HENT:      Head: Normocephalic and atraumatic.      Right Ear: Hearing normal.      Left Ear: Hearing normal.      Nose: Congestion present.      Mouth/Throat:      Mouth: Mucous membranes are moist.      Pharynx: No oropharyngeal exudate or posterior oropharyngeal erythema.   Cardiovascular:      Rate and Rhythm: Normal rate and regular rhythm.      Heart sounds: Normal heart sounds.   Pulmonary:      Effort: Pulmonary effort is normal. No respiratory distress.      Breath sounds: Normal breath sounds. No stridor. No wheezing, rhonchi or rales.   Musculoskeletal:      Comments: Normal movement in all 4 extremities   Skin:     General: Skin is warm and dry.   Neurological:      Mental Status: She is alert.      Coordination: Coordination normal.   Psychiatric:         Mood and Affect: Mood normal.       COVID -  FLU -  RSV -    DX CHEST  FINDINGS:  The heart is normal in size.  No pulmonary infiltrates or consolidations are noted.  No pleural effusions are appreciated.        IMPRESSION:     No active disease.    Assessment/Plan:   Assessment    1. Chest congestion    2. Acute cough  - POCT CoV-2, Flu A/B, RSV by PCR  - DX-CHEST-2 VIEWS; Future  - albuterol 108 (90 Base) MCG/ACT Aero Soln inhalation aerosol; Inhale 2 Puffs every 6 hours as needed for Shortness of Breath.  Dispense: 8.5 g; Refill: 0    Other orders  - propranolol (INDERAL) 10 MG Tab; TAKE 1 TABLET BY MOUTH TWICE DAILY FOR HEADACHE PREVENTION    Symptoms and presentation consistent with viral illness at this time.  Vital signs are stable on exam today.    Patient encouraged to get plenty of rest, use OTC tylenol for pain/fever, and drink plenty of fluids.    Differential diagnosis, natural history, supportive care, and indications for immediate  follow-up discussed.   Patient given instructions and understanding of medications and treatment.    If not improving in 3-5 days, F/U with PCP or return to UC if symptoms worsen.    Patient agreeable to plan.    Please note that this dictation was created using voice recognition software. I have made every reasonable attempt to correct obvious errors, but I expect that there are errors of grammar and possibly content that I did not discover before finalizing the note.    Pranay Mcdonald PA-C

## 2024-11-26 NOTE — PROGRESS NOTES
Subjective:   Tracy Reid is a 29 y.o. female who presents for Cough (Chest pressure,x1.5 days)      Cough  This is a new problem. The current episode started in the past 7 days. The problem has been gradually worsening. The cough is Non-productive. Associated symptoms include chills, a fever, headaches, myalgias, nasal congestion and postnasal drip. Pertinent negatives include no chest pain, ear pain, rash, sore throat or shortness of breath. Treatments tried: Tylenol/albuterol inhaler/Flonase. The treatment provided mild relief. There is no history of asthma, bronchitis or pneumonia.       Review of Systems   Constitutional:  Positive for chills, fever and malaise/fatigue.   HENT:  Positive for postnasal drip. Negative for congestion, ear pain, hearing loss and sore throat.    Respiratory:  Positive for cough. Negative for shortness of breath.    Cardiovascular:  Negative for chest pain.   Gastrointestinal:  Negative for abdominal pain, diarrhea, nausea and vomiting.   Genitourinary:  Negative for dysuria.   Musculoskeletal:  Positive for myalgias.   Skin:  Negative for rash.   Neurological:  Positive for headaches.       Past Medical History:   Diagnosis Date    Lyme disease        Current Outpatient Medications Ordered in Epic   Medication Sig Dispense Refill    benzonatate (TESSALON) 100 MG Cap Take 1 Capsule by mouth 3 times a day as needed for Cough for up to 10 days. 30 Capsule 0    oseltamivir (TAMIFLU) 75 MG Cap Take 1 Capsule by mouth 2 times a day. 10 Capsule 0    propranolol (INDERAL) 10 MG Tab TAKE 1 TABLET BY MOUTH TWICE DAILY FOR HEADACHE PREVENTION      albuterol 108 (90 Base) MCG/ACT Aero Soln inhalation aerosol Inhale 2 Puffs every 6 hours as needed for Shortness of Breath. 8.5 g 0    metoclopramide (REGLAN) 10 MG Tab TAKE 1 TABLET BY MOUTH THREE TIMES DAILY AS NEEDED FOR NAUSEA AND VOMITING FOR 7 DAYS      NURTEC 75 MG TABLET DISPERSIBLE DISSOLVE 1 TABLET BY MOUTH EVERY OTHER DAY FOR  "MIGRAINE PREVENTION      SUCRAID 8500 UNIT/ML Solution       nortriptyline (PAMELOR) 50 MG capsule TAKE 1 CAPSULE BY MOUTH TWICE DAILY FOR MIGRAINE PREVENTION      NORTRIPTYLINE HCL PO Take  by mouth every day.      SACROSIDASE PO For CSID      clonazePAM (KLONOPIN) 0.5 MG Tab Take 0.5 mg by mouth every day.      memantine (NAMENDA) 10 MG Tab TAKE 1/2 (ONE-HALF) TABLET BY MOUTH AT BEDTIME FOR 7 DAYS, INCREASE BY 1/2 (ONE-HALF)TABLET WEEKLY TO GOAL OF 1 TABLET TWICE A DAY      QUEtiapine (SEROQUEL) 50 MG tablet       gabapentin (NEURONTIN) 400 MG Cap Take 400 mg by mouth 3 times a day.      mupirocin (BACTROBAN) 2 % Ointment Apply 1 Application topically 2 times a day. 22 g 0    rizatriptan (MAXALT) 10 MG tablet Take 10 mg by mouth.      ondansetron (ZOFRAN) 4 MG Tab tablet Take 4 mg by mouth.      nortriptyline (PAMELOR) 25 MG Cap Take 50 mg by mouth 2 times a day.      LORazepam (ATIVAN) 1 MG Tab TAKE 1 TABLET BY MOUTH ONCE DAILY AS NEEDED FOR PANIC ATTACK FOR 30 DAYS      gabapentin (NEURONTIN) 300 MG Cap Take 300 mg by mouth 2 times a day.      QULIPTA 60 MG Tab Take 1 Tablet by mouth every day.       No current [facility]-ordered facility-administered medications on file.       No past surgical history on file.    Social History     Tobacco Use    Smoking status: Former     Types: Cigarettes    Smokeless tobacco: Never   Vaping Use    Vaping status: Every Day    Substances: Nicotine    Devices: Disposable   Substance Use Topics    Alcohol use: Not Currently    Drug use: Yes     Comment: Marijuana       family history is not on file.        Objective:     /58 (BP Location: Right arm, Patient Position: Sitting, BP Cuff Size: Adult)   Pulse (!) 114   Temp 37.6 °C (99.6 °F) (Temporal)   Resp 18   Ht 1.702 m (5' 7\")   Wt 62.4 kg (137 lb 8 oz)   SpO2 97%     Physical Exam  Vitals and nursing note reviewed.   Constitutional:       General: She is not in acute distress.     Appearance: Normal appearance. She is " ill-appearing.   HENT:      Head: Normocephalic and atraumatic.      Right Ear: Tympanic membrane normal.      Left Ear: Tympanic membrane normal.      Nose: Congestion and rhinorrhea present.      Mouth/Throat:      Mouth: Mucous membranes are moist.      Pharynx: Uvula midline. Postnasal drip present. No pharyngeal swelling, oropharyngeal exudate, posterior oropharyngeal erythema or uvula swelling.      Tonsils: No tonsillar exudate or tonsillar abscesses.   Eyes:      Conjunctiva/sclera: Conjunctivae normal.   Cardiovascular:      Rate and Rhythm: Normal rate.      Heart sounds: Normal heart sounds.   Pulmonary:      Effort: Pulmonary effort is normal. No respiratory distress.      Breath sounds: Normal breath sounds. No stridor. No wheezing or rhonchi.   Abdominal:      General: Abdomen is flat.      Palpations: Abdomen is soft.   Musculoskeletal:         General: Normal range of motion.      Cervical back: Normal range of motion.   Skin:     General: Skin is warm and dry.      Capillary Refill: Capillary refill takes less than 2 seconds.   Neurological:      Mental Status: She is alert and oriented to person, place, and time.   Psychiatric:         Mood and Affect: Mood normal.         Behavior: Behavior normal.       Results for orders placed or performed in visit on 11/26/24   POCT CoV-2, Flu A/B, RSV by PCR    Collection Time: 11/26/24  2:03 PM   Result Value Ref Range    SARS-CoV-2 by PCR Negative Negative, Invalid    Influenza virus A RNA Positive (A) Negative, Invalid    Influenza virus B, PCR Negative Negative, Invalid    RSV, PCR Negative Negative, Invalid         Assessment/Plan:       1. Influenza A  POCT CoV-2, Flu A/B, RSV by PCR    benzonatate (TESSALON) 100 MG Cap    oseltamivir (TAMIFLU) 75 MG Cap        After assessment patient here with worsening flulike symptoms after being seen last night in the urgent care.  Patient did have a chest x-ray and viral swab performed at that time which was  negative. Patient symptoms did start earlier yesterday and was told by previous provider that if symptoms did worsen that she should come back in to be retested.  Patient reports that soon after she left visit last night she developed severe body aches/chills and fatigue.  Patient reports that she did take Tylenol to help improve the symptoms.  Patient at this time would like to retest to verify that she does not have the influenza.  Patient at previous visit was provided albuterol inhaler and was told to use over-the-counter Flonase for symptoms.  I did at this time suggest to patient if you other prescription medications including steroids and prescription cough medications that may help with symptoms.  Patient does report that she is very sensitive to medication and would like to avoid any steroids.  Patient does also have allergies to Phenergan so I prescription cough syrup and at this time did provide patient prescription for Tessalon Perles.  Repeat viral swab was performed in office and patient was positive for influenza A.  Tamiflu was sent to patient pharmacy at this time.  Recommend adequate hydration, rest, deep breathing and coughing, ambulation as tolerated, OTC medications.  Patient was instructed to monitor for any worsening signs and symptoms and patient was instructed that if no improvement with current regimen and symptoms to continue to worsen that she should go to ER for further management.    Differential diagnosis, natural history, and supportive care discussed. We also reviewed side effects of medication including allergic response, GI upset, tendon injury, rash, sedation etc. Patient and/or guardian voices understanding.      Advised the patient to follow-up with the primary care physician for recheck, reevaluation, and consideration of further management.    I personally reviewed prior external notes and test results pertinent to today's visit as well as additional imaging and testing  completed in clinic today.     Please note that this dictation was created using voice recognition software. I have made every reasonable attempt to correct obvious errors, but I expect that there are errors of grammar and possibly content that I did not discover before finalizing the note.    This note was electronically signed by АНДРЕЙ Tovar

## 2024-11-26 NOTE — LETTER
[facility] [place]  [address]  [place] [address]     November 26, 2024    Patient: Tracy Reid   YOB: 1995   Date of Visit: 11/26/2024       To Whom It May Concern:    Tracy Reid was seen and treated in our department on 11/26/2024. Please excuse Patient for the rest of this week due to recent illness. Patient able to return Monday 12/2/2024 if symptoms are improving.     Sincerely,     NAZARIO Chang.

## 2025-04-14 ENCOUNTER — HOSPITAL ENCOUNTER (OUTPATIENT)
Facility: MEDICAL CENTER | Age: 30
End: 2025-04-14
Payer: COMMERCIAL

## 2025-04-14 PROCEDURE — 88142 CYTOPATH C/V THIN LAYER: CPT

## 2025-04-23 LAB — THINPREP PAP, CYTOLOGY NL11781: NORMAL

## 2025-05-07 ENCOUNTER — OFFICE VISIT (OUTPATIENT)
Dept: URGENT CARE | Facility: PHYSICIAN GROUP | Age: 30
End: 2025-05-07
Payer: COMMERCIAL

## 2025-05-07 ENCOUNTER — RESULTS FOLLOW-UP (OUTPATIENT)
Dept: URGENT CARE | Facility: PHYSICIAN GROUP | Age: 30
End: 2025-05-07

## 2025-05-07 VITALS
TEMPERATURE: 99.1 F | HEIGHT: 67 IN | HEART RATE: 92 BPM | WEIGHT: 148.8 LBS | OXYGEN SATURATION: 97 % | DIASTOLIC BLOOD PRESSURE: 60 MMHG | BODY MASS INDEX: 23.35 KG/M2 | RESPIRATION RATE: 12 BRPM | SYSTOLIC BLOOD PRESSURE: 100 MMHG

## 2025-05-07 DIAGNOSIS — J02.9 PHARYNGITIS, UNSPECIFIED ETIOLOGY: ICD-10-CM

## 2025-05-07 DIAGNOSIS — R09.82 PND (POST-NASAL DRIP): ICD-10-CM

## 2025-05-07 DIAGNOSIS — H93.8X3 EAR PRESSURE, BILATERAL: ICD-10-CM

## 2025-05-07 LAB — S PYO DNA SPEC NAA+PROBE: NOT DETECTED

## 2025-05-07 PROCEDURE — 87651 STREP A DNA AMP PROBE: CPT

## 2025-05-07 PROCEDURE — 3074F SYST BP LT 130 MM HG: CPT

## 2025-05-07 PROCEDURE — 99213 OFFICE O/P EST LOW 20 MIN: CPT

## 2025-05-07 PROCEDURE — 3078F DIAST BP <80 MM HG: CPT

## 2025-05-07 RX ORDER — MEMANTINE HYDROCHLORIDE 10 MG/1
1 TABLET ORAL 2 TIMES DAILY
COMMUNITY

## 2025-05-07 RX ORDER — PROPRANOLOL HCL 20 MG
TABLET ORAL
COMMUNITY
Start: 2025-04-22

## 2025-05-07 RX ORDER — CLONAZEPAM 0.5 MG/1
1 TABLET ORAL DAILY
COMMUNITY

## 2025-05-07 RX ORDER — GABAPENTIN 400 MG/1
1 CAPSULE ORAL 2 TIMES DAILY
COMMUNITY

## 2025-05-07 ASSESSMENT — FIBROSIS 4 INDEX: FIB4 SCORE: 0.28

## 2025-05-07 ASSESSMENT — ENCOUNTER SYMPTOMS
SORE THROAT: 0
COUGH: 0
FEVER: 0

## 2025-05-08 NOTE — PROGRESS NOTES
"Subjective:     CHIEF COMPLAINT  Chief Complaint   Patient presents with    Ear Fullness     Bilateral ear pressure, congestion, throat tickling, x3 days        HPI  Tracy Reid is a very pleasant 29 y.o. female who presents with ear pressure and ear fullness present bilaterally for the past 3 days.  She has also been experiencing a tickle in her throat without pain.  She has not had any fevers or cough.  She did experience nasal congestion last night that seems to have improved today.  She denies any body aches or chills.  She takes Zyrtec daily.      REVIEW OF SYSTEMS  Review of Systems   Constitutional:  Negative for fever.   HENT:  Positive for congestion (Congestion last night, resolved today). Negative for ear pain and sore throat (Tickle in throat).    Respiratory:  Negative for cough.        PAST MEDICAL HISTORY  There are no active problems to display for this patient.      SURGICAL HISTORY  patient denies any surgical history    ALLERGIES  Allergies   Allergen Reactions    Promethazine Unspecified     \"Passes out, seizures, stroke-like symptoms.\"    Other Reaction(s): Other (See Comments)    Ciprofloxacin      \"Anything in the flox class I don't want\"    Lamotrigine Rash       CURRENT MEDICATIONS  Home Medications       Reviewed by Liliane Sams P.A.-C. (Physician Assistant) on 05/07/25 at 1739  Med List Status: <None>     Medication Last Dose Status   albuterol 108 (90 Base) MCG/ACT Aero Soln inhalation aerosol Taking Active   clonazePAM (KLONOPIN) 0.5 MG Tab Taking Active   clonazePAM (KLONOPIN) 0.5 MG Tab Taking Active   gabapentin (NEURONTIN) 300 MG Cap Taking Active   gabapentin (NEURONTIN) 400 MG Cap Taking Active   gabapentin (NEURONTIN) 400 MG Cap Taking Active   LORazepam (ATIVAN) 1 MG Tab Taking Active   memantine (NAMENDA) 10 MG Tab Taking Active   memantine (NAMENDA) 10 MG Tab Taking Active   metoclopramide (REGLAN) 10 MG Tab Taking Active   mupirocin (BACTROBAN) 2 % Ointment " "Taking Active   nortriptyline (PAMELOR) 25 MG Cap Taking Active   nortriptyline (PAMELOR) 50 MG capsule Taking Active   NORTRIPTYLINE HCL PO Taking Active   NURTEC 75 MG TABLET DISPERSIBLE Taking Active   ondansetron (ZOFRAN) 4 MG Tab tablet Taking Active   oseltamivir (TAMIFLU) 75 MG Cap Taking Active   propranolol (INDERAL) 10 MG Tab Taking Active   propranolol (INDERAL) 20 MG Tab Taking Active   QUEtiapine (SEROQUEL) 50 MG tablet Taking Active   QULIPTA 60 MG Tab Taking Active   rizatriptan (MAXALT) 10 MG tablet Taking Active   SACROSIDASE PO Taking Active   SUCRAID 8500 UNIT/ML Solution Taking Active                    SOCIAL HISTORY  Social History     Tobacco Use    Smoking status: Former     Types: Cigarettes    Smokeless tobacco: Never   Vaping Use    Vaping status: Every Day    Substances: Nicotine    Devices: Disposable   Substance and Sexual Activity    Alcohol use: Not Currently    Drug use: Yes     Comment: Marijuana    Sexual activity: Not on file       FAMILY HISTORY  History reviewed. No pertinent family history.       Objective:     VITAL SIGNS: /60 (BP Location: Right arm, Patient Position: Sitting, BP Cuff Size: Adult)   Pulse 92   Temp 37.3 °C (99.1 °F) (Temporal)   Resp 12   Ht 1.702 m (5' 7\")   Wt 67.5 kg (148 lb 12.8 oz)   SpO2 97%   BMI 23.31 kg/m²     PHYSICAL EXAM  Physical Exam  Vitals reviewed.   Constitutional:       General: She is not in acute distress.     Appearance: Normal appearance. She is not ill-appearing or toxic-appearing.   HENT:      Head: Normocephalic and atraumatic.      Right Ear: Tympanic membrane, ear canal and external ear normal.      Left Ear: Tympanic membrane, ear canal and external ear normal.      Nose: No congestion.      Mouth/Throat:      Mouth: Mucous membranes are moist.      Pharynx: Uvula midline. Pharyngeal swelling (Mild) and postnasal drip present. No oropharyngeal exudate or posterior oropharyngeal erythema.   Eyes:      " Conjunctiva/sclera: Conjunctivae normal.      Pupils: Pupils are equal, round, and reactive to light.   Cardiovascular:      Rate and Rhythm: Normal rate.   Pulmonary:      Effort: Pulmonary effort is normal. No respiratory distress.   Skin:     General: Skin is warm and dry.      Coloration: Skin is not pale.   Neurological:      General: No focal deficit present.      Mental Status: She is alert and oriented to person, place, and time.   Psychiatric:         Mood and Affect: Mood normal.         Assessment/Plan:     1. Pharyngitis, unspecified etiology  - POCT CEPHEID GROUP A STREP - PCR    2. Ear pressure, bilateral    3. PND (post-nasal drip)    Other orders  - clonazePAM (KLONOPIN) 0.5 MG Tab; Take 1 Tablet by mouth every day.  - gabapentin (NEURONTIN) 400 MG Cap; Take 1 Capsule by mouth 2 times a day.  - memantine (NAMENDA) 10 MG Tab; Take 1 Tablet by mouth 2 times a day.  - propranolol (INDERAL) 20 MG Tab; TAKE 1 TABLET BY MOUTH TWICE DAILY; STOP IF ANY SIDE EFFECTS  -Warm salt water gargles as needed for sore throat  -Flonase nasal spray OTC if tolerated.  1 spray per nostril once to twice daily  -Continue Zyrtec daily  -Return to clinic if symptoms worsen or fail to resolve      MDM/Comments:  Patient has stable vital signs and is non-toxic appearing.  Strep testing has been performed in the office with negative results.  Patient has been informed of results via [employer]. Patient did have postnasal drip present on physical exam.  Discussed bilateral ear pressure and postnasal drip are likely due to allergies.  Patient reports large sensitivities to medications, but is currently tolerating Zyrtec.  She will continue Zyrtec.  Discussed use of Flonase.  Discussed supportive care with warm salt water gargles. Patient demonstrated understanding of treatment plan at this time and will RTC if symptoms worsen or fail to resolve.     Differential diagnosis, natural history, supportive care, and indications for  immediate follow-up discussed. All questions answered. Patient agrees with the plan of care.    Follow-up as needed if symptoms worsen or fail to improve to PCP, Urgent care or Emergency Room.    I have personally reviewed prior external notes and test results pertinent to today's visit.  I have independently reviewed and interpreted all diagnostics ordered during this urgent care acute visit.   Discussed management options (risks,benefits, and alternatives to treatment). Pt expresses understanding and the treatment plan was agreed upon. Questions were encouraged and answered to pt's satisfaction.    Please note that this dictation was created using voice recognition software. I have made a reasonable attempt to correct obvious errors, but I expect that there are errors of grammar and possibly content that I did not discover before finalizing the note.

## 2025-06-20 ENCOUNTER — OFFICE VISIT (OUTPATIENT)
Dept: MEDICAL GROUP | Facility: MEDICAL CENTER | Age: 30
End: 2025-06-20
Payer: COMMERCIAL

## 2025-06-20 VITALS
WEIGHT: 149 LBS | DIASTOLIC BLOOD PRESSURE: 70 MMHG | HEIGHT: 67 IN | OXYGEN SATURATION: 97 % | SYSTOLIC BLOOD PRESSURE: 110 MMHG | BODY MASS INDEX: 23.39 KG/M2 | TEMPERATURE: 98.2 F | HEART RATE: 82 BPM

## 2025-06-20 DIAGNOSIS — F13.20 BENZODIAZEPINE DEPENDENCE (HCC): ICD-10-CM

## 2025-06-20 DIAGNOSIS — F41.9 ANXIETY: ICD-10-CM

## 2025-06-20 DIAGNOSIS — Z13.1 ENCOUNTER FOR SCREENING FOR DIABETES MELLITUS: ICD-10-CM

## 2025-06-20 DIAGNOSIS — Z83.3 FAMILY HISTORY OF DIABETES MELLITUS: ICD-10-CM

## 2025-06-20 DIAGNOSIS — E74.31 CONGENITAL SUCRASE-ISOMALTASE DEFICIENCY: Primary | ICD-10-CM

## 2025-06-20 DIAGNOSIS — E06.3 HASHIMOTO THYROIDITIS: ICD-10-CM

## 2025-06-20 DIAGNOSIS — Z01.84 IMMUNITY STATUS TESTING: ICD-10-CM

## 2025-06-20 DIAGNOSIS — G43.711 CHRONIC MIGRAINE WITHOUT AURA, WITH INTRACTABLE MIGRAINE, SO STATED, WITH STATUS MIGRAINOSUS: ICD-10-CM

## 2025-06-20 DIAGNOSIS — Z13.6 ENCOUNTER FOR SCREENING FOR CARDIOVASCULAR DISORDERS: ICD-10-CM

## 2025-06-20 PROCEDURE — 99214 OFFICE O/P EST MOD 30 MIN: CPT | Performed by: HEALTH CARE PROVIDER

## 2025-06-20 PROCEDURE — 3074F SYST BP LT 130 MM HG: CPT | Performed by: HEALTH CARE PROVIDER

## 2025-06-20 PROCEDURE — 3078F DIAST BP <80 MM HG: CPT | Performed by: HEALTH CARE PROVIDER

## 2025-06-20 RX ORDER — ONDANSETRON 4 MG/1
4 TABLET, FILM COATED ORAL EVERY 8 HOURS PRN
COMMUNITY
Start: 2025-06-20

## 2025-06-20 ASSESSMENT — ENCOUNTER SYMPTOMS
PALPITATIONS: 0
FEVER: 0
DIZZINESS: 0
CHILLS: 0
SHORTNESS OF BREATH: 0

## 2025-06-20 ASSESSMENT — FIBROSIS 4 INDEX: FIB4 SCORE: 0.29

## 2025-06-20 NOTE — ASSESSMENT & PLAN NOTE
Chronic, stable.  - Continue to work with psychiatry, requesting records today    Orders:    CBC WITH DIFFERENTIAL; Future    TSH WITH REFLEX TO FT4; Future

## 2025-06-20 NOTE — ASSESSMENT & PLAN NOTE
Chronic, stable.  - Patient is on a significant amount of preventative medications to include every other day Nurtec 75 mg, nortriptyline 50 mg capsule 2 times a day, propranolol 10 mg twice daily.  - She does have abortive medication Maxalt 10 mg, but states she never has to use it because of the addition of Nurtec to her regimen  - Continue to follow with neurology, records requested today    Orders:    CBC WITH DIFFERENTIAL; Future

## 2025-06-20 NOTE — PROGRESS NOTES
Subjective:     CC:    Chief Complaint   Patient presents with    Establish Care    Referral Needed       HISTORY OF THE PRESENT ILLNESS: Patient is a 30 y.o. female. This pleasant patient is here today to establish care.     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     Chronic Migraine Without Aura, With Intractable Migraine, So Stated, With Status Migrainosus    Managed by neurology.   Symptoms typically unilateral headache with photosensitivity, nausea and vomiting. No aura usually but did have retinal migraine recently.    Neuro: [facility]     Anxiety    Chronic issue due to health concerns, diagnosed with lyme past and has been having issues since then.  Was put on the benzodiazepines for both the symptoms of the Lyme and her anxiety, has developed chemical dependence to this medication and is working with her psychiatrist to taper off.  Also taking propranolol, which is mainly for migraine prevention but also is helping with tachycardia associated to anxiety       Benzodiazepine Dependence (Hcc)    Is trying to wean off through her psychiatrist, was started on benzos for her lyme symptoms, but now awful pain when experiencing withdrawal symptoms from her klonopin.   Never has had seizures from withdrawal  Gabapentin 400 mg twice daily is being used for pain she experiences with withdrawal as she is tapering off the medication with her psychiatrist     Hashimoto Thyroiditis    States diagnosed as a child, has never had to take thyroid medication, believes she had labs checked approximately 2 years ago           ROS:   Review of Systems   Constitutional:  Negative for chills, fever and malaise/fatigue.   Respiratory:  Negative for shortness of breath.    Cardiovascular:  Negative for chest pain and  "palpitations.   Neurological:  Negative for dizziness.         Objective:     Exam: /70   Pulse 82   Temp 36.8 °C (98.2 °F) (Temporal)   Ht 1.702 m (5' 7\")   Wt 67.6 kg (149 lb)   SpO2 97%  Body mass index is 23.34 kg/m².    Physical Exam  Constitutional:       Appearance: Normal appearance.   HENT:      Head: Normocephalic.   Eyes:      Conjunctiva/sclera: Conjunctivae normal.   Cardiovascular:      Rate and Rhythm: Normal rate and regular rhythm.      Heart sounds: Normal heart sounds.   Pulmonary:      Effort: Pulmonary effort is normal.      Breath sounds: Normal breath sounds.   Lymphadenopathy:      Cervical: No cervical adenopathy.   Neurological:      General: No focal deficit present.      Mental Status: She is alert.      Gait: Gait normal.   Psychiatric:         Mood and Affect: Mood normal.         Behavior: Behavior normal.             Assessment & Plan:   30 y.o. female with the following -    Assessment & Plan  Congenital sucrase-isomaltase deficiency  Chronic, stable.  - Reportedly a mild case, does not follow with GI.  - Not taking any medication due to cost and the mildness of her symptoms that can be controlled with diet.  - If this does begin to worsen and increasing GI symptoms refer her to GI.         Chronic migraine without aura, with intractable migraine, so stated, with status migrainosus  Chronic, stable.  - Patient is on a significant amount of preventative medications to include every other day Nurtec 75 mg, nortriptyline 50 mg capsule 2 times a day, propranolol 10 mg twice daily.  - She does have abortive medication Maxalt 10 mg, but states she never has to use it because of the addition of Nurtec to her regimen  - Continue to follow with neurology, records requested today    Orders:    CBC WITH DIFFERENTIAL; Future    Anxiety  Chronic, stable.  - Continue to work with psychiatry, requesting records today    Orders:    CBC WITH DIFFERENTIAL; Future    TSH WITH REFLEX TO FT4; " Future    Benzodiazepine dependence (HCC)  Chronic, stable.  - Continue to work with psychiatry to taper off of the Klonopin           Immunity status testing    Orders:    VARICELLA ZOSTER IGG AB; Future    HEP B SURFACE AB; Future    Hashimoto thyroiditis  Chronic, stable.  - I am assuming at this point that maybe she had anti-TPO antibodies that were positive when she was younger, but TSH and other thyroid hormones never were abnormal.  - I will check her TSH for establishment of care and follow-up as needed depending on lab values    Orders:    TSH WITH REFLEX TO FT4; Future    Encounter for screening for cardiovascular disorders    Orders:    Lipid Profile; Future    Encounter for screening for diabetes mellitus    Orders:    Comp Metabolic Panel; Future    Family history of diabetes mellitus    Orders:    Comp Metabolic Panel; Future           Return in about 6 months (around 12/20/2025) for Med Check.    Please note that this dictation was created using voice recognition software. I have made every reasonable attempt to correct obvious errors, but I expect that there are errors of grammar and possibly content that I did not discover before finalizing the note.

## 2025-06-20 NOTE — ASSESSMENT & PLAN NOTE
Chronic, stable.  - Reportedly a mild case, does not follow with GI.  - Not taking any medication due to cost and the mildness of her symptoms that can be controlled with diet.  - If this does begin to worsen and increasing GI symptoms refer her to GI.

## 2025-06-20 NOTE — ASSESSMENT & PLAN NOTE
Chronic, stable.  - I am assuming at this point that maybe she had anti-TPO antibodies that were positive when she was younger, but TSH and other thyroid hormones never were abnormal.  - I will check her TSH for establishment of care and follow-up as needed depending on lab values    Orders:    TSH WITH REFLEX TO FT4; Future

## 2025-07-03 DIAGNOSIS — G43.711 CHRONIC MIGRAINE WITHOUT AURA, WITH INTRACTABLE MIGRAINE, SO STATED, WITH STATUS MIGRAINOSUS: Primary | ICD-10-CM

## 2025-07-03 DIAGNOSIS — F41.9 ANXIETY: ICD-10-CM

## 2025-07-03 DIAGNOSIS — F13.20 BENZODIAZEPINE DEPENDENCE (HCC): ICD-10-CM

## 2025-07-25 DIAGNOSIS — D22.9 MULTIPLE NEVI: Primary | ICD-10-CM

## 2025-07-30 DIAGNOSIS — Z12.83 SKIN CANCER SCREENING: Primary | ICD-10-CM

## 2025-08-14 ENCOUNTER — HOSPITAL ENCOUNTER (OUTPATIENT)
Dept: LAB | Facility: MEDICAL CENTER | Age: 30
End: 2025-08-14
Attending: HEALTH CARE PROVIDER
Payer: COMMERCIAL

## 2025-08-14 DIAGNOSIS — E06.3 HASHIMOTO THYROIDITIS: ICD-10-CM

## 2025-08-14 DIAGNOSIS — Z01.84 IMMUNITY STATUS TESTING: ICD-10-CM

## 2025-08-14 DIAGNOSIS — Z83.3 FAMILY HISTORY OF DIABETES MELLITUS: ICD-10-CM

## 2025-08-14 DIAGNOSIS — F41.9 ANXIETY: ICD-10-CM

## 2025-08-14 DIAGNOSIS — Z13.6 ENCOUNTER FOR SCREENING FOR CARDIOVASCULAR DISORDERS: ICD-10-CM

## 2025-08-14 DIAGNOSIS — G43.711 CHRONIC MIGRAINE WITHOUT AURA, WITH INTRACTABLE MIGRAINE, SO STATED, WITH STATUS MIGRAINOSUS: ICD-10-CM

## 2025-08-14 DIAGNOSIS — Z13.1 ENCOUNTER FOR SCREENING FOR DIABETES MELLITUS: ICD-10-CM

## 2025-08-14 LAB
ALBUMIN SERPL BCP-MCNC: 4.5 G/DL (ref 3.2–4.9)
ALBUMIN/GLOB SERPL: 1.8 G/DL
ALP SERPL-CCNC: 65 U/L (ref 30–99)
ALT SERPL-CCNC: 20 U/L (ref 2–50)
ANION GAP SERPL CALC-SCNC: 11 MMOL/L (ref 7–16)
AST SERPL-CCNC: 18 U/L (ref 12–45)
BASOPHILS # BLD AUTO: 1 % (ref 0–1.8)
BASOPHILS # BLD: 0.06 K/UL (ref 0–0.12)
BILIRUB SERPL-MCNC: 0.6 MG/DL (ref 0.1–1.5)
BUN SERPL-MCNC: 12 MG/DL (ref 8–22)
CALCIUM ALBUM COR SERPL-MCNC: 8.5 MG/DL (ref 8.5–10.5)
CALCIUM SERPL-MCNC: 8.9 MG/DL (ref 8.5–10.5)
CHLORIDE SERPL-SCNC: 107 MMOL/L (ref 96–112)
CHOLEST SERPL-MCNC: 168 MG/DL (ref 100–199)
CO2 SERPL-SCNC: 21 MMOL/L (ref 20–33)
CREAT SERPL-MCNC: 0.62 MG/DL (ref 0.5–1.4)
EOSINOPHIL # BLD AUTO: 0.12 K/UL (ref 0–0.51)
EOSINOPHIL NFR BLD: 2 % (ref 0–6.9)
ERYTHROCYTE [DISTWIDTH] IN BLOOD BY AUTOMATED COUNT: 41.6 FL (ref 35.9–50)
GFR SERPLBLD CREATININE-BSD FMLA CKD-EPI: 124 ML/MIN/1.73 M 2
GLOBULIN SER CALC-MCNC: 2.5 G/DL (ref 1.9–3.5)
GLUCOSE SERPL-MCNC: 95 MG/DL (ref 65–99)
HBV SURFACE AB SERPL IA-ACNC: 35.9 MIU/ML (ref 0–10)
HCT VFR BLD AUTO: 43.8 % (ref 37–47)
HDLC SERPL-MCNC: 36 MG/DL
HGB BLD-MCNC: 14.2 G/DL (ref 12–16)
IMM GRANULOCYTES # BLD AUTO: 0.02 K/UL (ref 0–0.11)
IMM GRANULOCYTES NFR BLD AUTO: 0.3 % (ref 0–0.9)
LDLC SERPL CALC-MCNC: 112 MG/DL
LYMPHOCYTES # BLD AUTO: 2.22 K/UL (ref 1–4.8)
LYMPHOCYTES NFR BLD: 36.4 % (ref 22–41)
MCH RBC QN AUTO: 29.2 PG (ref 27–33)
MCHC RBC AUTO-ENTMCNC: 32.4 G/DL (ref 32.2–35.5)
MCV RBC AUTO: 90.1 FL (ref 81.4–97.8)
MONOCYTES # BLD AUTO: 0.54 K/UL (ref 0–0.85)
MONOCYTES NFR BLD AUTO: 8.9 % (ref 0–13.4)
NEUTROPHILS # BLD AUTO: 3.14 K/UL (ref 1.82–7.42)
NEUTROPHILS NFR BLD: 51.4 % (ref 44–72)
NRBC # BLD AUTO: 0 K/UL
NRBC BLD-RTO: 0 /100 WBC (ref 0–0.2)
PLATELET # BLD AUTO: 355 K/UL (ref 164–446)
PMV BLD AUTO: 11.2 FL (ref 9–12.9)
POTASSIUM SERPL-SCNC: 4 MMOL/L (ref 3.6–5.5)
PROT SERPL-MCNC: 7 G/DL (ref 6–8.2)
RBC # BLD AUTO: 4.86 M/UL (ref 4.2–5.4)
SODIUM SERPL-SCNC: 139 MMOL/L (ref 135–145)
TRIGL SERPL-MCNC: 100 MG/DL (ref 0–149)
TSH SERPL DL<=0.005 MIU/L-ACNC: 2.09 UIU/ML (ref 0.38–5.33)
WBC # BLD AUTO: 6.1 K/UL (ref 4.8–10.8)

## 2025-08-14 PROCEDURE — 36415 COLL VENOUS BLD VENIPUNCTURE: CPT

## 2025-08-14 PROCEDURE — 80053 COMPREHEN METABOLIC PANEL: CPT

## 2025-08-14 PROCEDURE — 86787 VARICELLA-ZOSTER ANTIBODY: CPT

## 2025-08-14 PROCEDURE — 85025 COMPLETE CBC W/AUTO DIFF WBC: CPT

## 2025-08-14 PROCEDURE — 80061 LIPID PANEL: CPT

## 2025-08-14 PROCEDURE — 86706 HEP B SURFACE ANTIBODY: CPT

## 2025-08-14 PROCEDURE — 84443 ASSAY THYROID STIM HORMONE: CPT

## 2025-08-17 LAB — VZV IGG SER IA-ACNC: 7.5 S/CO

## 2025-12-18 ENCOUNTER — APPOINTMENT (OUTPATIENT)
Dept: MEDICAL GROUP | Facility: MEDICAL CENTER | Age: 30
End: 2025-12-18
Payer: COMMERCIAL